# Patient Record
Sex: FEMALE | Race: WHITE | Employment: UNEMPLOYED | URBAN - NONMETROPOLITAN AREA
[De-identification: names, ages, dates, MRNs, and addresses within clinical notes are randomized per-mention and may not be internally consistent; named-entity substitution may affect disease eponyms.]

---

## 2017-07-27 ENCOUNTER — OFFICE VISIT (OUTPATIENT)
Dept: PSYCHIATRY | Age: 42
End: 2017-07-27
Payer: COMMERCIAL

## 2017-07-27 VITALS
WEIGHT: 176 LBS | SYSTOLIC BLOOD PRESSURE: 120 MMHG | HEART RATE: 74 BPM | BODY MASS INDEX: 31.18 KG/M2 | DIASTOLIC BLOOD PRESSURE: 70 MMHG | HEIGHT: 63 IN

## 2017-07-27 DIAGNOSIS — F41.1 GENERALIZED ANXIETY DISORDER: ICD-10-CM

## 2017-07-27 DIAGNOSIS — F33.1 MODERATE EPISODE OF RECURRENT MAJOR DEPRESSIVE DISORDER (HCC): Primary | ICD-10-CM

## 2017-07-27 DIAGNOSIS — G47.00 INSOMNIA, UNSPECIFIED TYPE: ICD-10-CM

## 2017-07-27 PROCEDURE — G0444 DEPRESSION SCREEN ANNUAL: HCPCS | Performed by: NURSE PRACTITIONER

## 2017-07-27 PROCEDURE — 90792 PSYCH DIAG EVAL W/MED SRVCS: CPT | Performed by: NURSE PRACTITIONER

## 2017-07-27 RX ORDER — OMEGA-3 FATTY ACIDS/FISH OIL 300-1000MG
CAPSULE ORAL
COMMUNITY

## 2017-07-27 RX ORDER — CETIRIZINE HYDROCHLORIDE 10 MG/1
10 TABLET ORAL DAILY
COMMUNITY

## 2017-07-27 RX ORDER — IBUPROFEN 200 MG
600 TABLET ORAL EVERY 6 HOURS PRN
COMMUNITY

## 2017-07-27 RX ORDER — MULTIVIT-MIN/FERROUS GLUCONATE 9 MG/15 ML
15 LIQUID (ML) ORAL DAILY
COMMUNITY

## 2017-07-27 RX ORDER — BUPROPION HYDROCHLORIDE 300 MG/1
300 TABLET ORAL EVERY MORNING
COMMUNITY

## 2017-07-27 RX ORDER — HYDROXYCHLOROQUINE SULFATE 200 MG/1
TABLET, FILM COATED ORAL DAILY
COMMUNITY

## 2017-07-27 RX ORDER — SERTRALINE HYDROCHLORIDE 100 MG/1
200 TABLET, FILM COATED ORAL DAILY
Qty: 60 TABLET | Refills: 0 | Status: SHIPPED
Start: 2017-07-27 | End: 2017-10-03 | Stop reason: SDUPTHER

## 2017-07-27 RX ORDER — ARIPIPRAZOLE 5 MG/1
5 TABLET ORAL DAILY
Qty: 30 TABLET | Refills: 0 | Status: SHIPPED | OUTPATIENT
Start: 2017-07-27 | End: 2017-08-10 | Stop reason: SINTOL

## 2017-07-27 RX ORDER — HYDROXYZINE PAMOATE 25 MG/1
25 CAPSULE ORAL 3 TIMES DAILY PRN
COMMUNITY
End: 2017-10-03 | Stop reason: SDUPTHER

## 2017-07-27 RX ORDER — LIDOCAINE 50 MG/G
1 PATCH TOPICAL DAILY PRN
COMMUNITY

## 2017-07-27 RX ORDER — ESZOPICLONE 3 MG/1
3 TABLET, FILM COATED ORAL NIGHTLY
COMMUNITY

## 2017-07-27 RX ORDER — PREGABALIN 150 MG/1
150 CAPSULE ORAL 3 TIMES DAILY
COMMUNITY

## 2017-07-27 RX ORDER — SERTRALINE HYDROCHLORIDE 100 MG/1
250 TABLET, FILM COATED ORAL DAILY
COMMUNITY
End: 2017-07-27 | Stop reason: DRUGHIGH

## 2017-07-27 RX ORDER — CYCLOSPORINE 0.5 MG/ML
1 EMULSION OPHTHALMIC 2 TIMES DAILY
COMMUNITY

## 2017-07-27 ASSESSMENT — ENCOUNTER SYMPTOMS
DOUBLE VISION: 0
ABDOMINAL PAIN: 0
NAUSEA: 0
DIARRHEA: 0
BLURRED VISION: 0
VOMITING: 0
PHOTOPHOBIA: 0

## 2017-07-27 ASSESSMENT — PATIENT HEALTH QUESTIONNAIRE - PHQ9
2. FEELING DOWN, DEPRESSED OR HOPELESS: 3
4. FEELING TIRED OR HAVING LITTLE ENERGY: 2
SUM OF ALL RESPONSES TO PHQ QUESTIONS 1-9: 23
1. LITTLE INTEREST OR PLEASURE IN DOING THINGS: 3
3. TROUBLE FALLING OR STAYING ASLEEP: 2
8. MOVING OR SPEAKING SO SLOWLY THAT OTHER PEOPLE COULD HAVE NOTICED. OR THE OPPOSITE, BEING SO FIGETY OR RESTLESS THAT YOU HAVE BEEN MOVING AROUND A LOT MORE THAN USUAL: 3
7. TROUBLE CONCENTRATING ON THINGS, SUCH AS READING THE NEWSPAPER OR WATCHING TELEVISION: 3
SUM OF ALL RESPONSES TO PHQ9 QUESTIONS 1 & 2: 6
10. IF YOU CHECKED OFF ANY PROBLEMS, HOW DIFFICULT HAVE THESE PROBLEMS MADE IT FOR YOU TO DO YOUR WORK, TAKE CARE OF THINGS AT HOME, OR GET ALONG WITH OTHER PEOPLE: 2
9. THOUGHTS THAT YOU WOULD BE BETTER OFF DEAD, OR OF HURTING YOURSELF: 1
5. POOR APPETITE OR OVEREATING: 3
6. FEELING BAD ABOUT YOURSELF - OR THAT YOU ARE A FAILURE OR HAVE LET YOURSELF OR YOUR FAMILY DOWN: 3

## 2017-07-27 ASSESSMENT — ANXIETY QUESTIONNAIRES
1. FEELING NERVOUS, ANXIOUS, OR ON EDGE: 3-NEARLY EVERY DAY
GAD7 TOTAL SCORE: 17
4. TROUBLE RELAXING: 3-NEARLY EVERY DAY
5. BEING SO RESTLESS THAT IT IS HARD TO SIT STILL: 2-OVER HALF THE DAYS
7. FEELING AFRAID AS IF SOMETHING AWFUL MIGHT HAPPEN: 1-SEVERAL DAYS
3. WORRYING TOO MUCH ABOUT DIFFERENT THINGS: 2-OVER HALF THE DAYS
2. NOT BEING ABLE TO STOP OR CONTROL WORRYING: 3-NEARLY EVERY DAY
6. BECOMING EASILY ANNOYED OR IRRITABLE: 3-NEARLY EVERY DAY

## 2017-08-10 ENCOUNTER — OFFICE VISIT (OUTPATIENT)
Dept: PSYCHIATRY | Age: 42
End: 2017-08-10
Payer: COMMERCIAL

## 2017-08-10 VITALS
DIASTOLIC BLOOD PRESSURE: 70 MMHG | WEIGHT: 176 LBS | HEART RATE: 66 BPM | BODY MASS INDEX: 31.18 KG/M2 | HEIGHT: 63 IN | SYSTOLIC BLOOD PRESSURE: 108 MMHG

## 2017-08-10 DIAGNOSIS — F33.1 MODERATE EPISODE OF RECURRENT MAJOR DEPRESSIVE DISORDER (HCC): Primary | ICD-10-CM

## 2017-08-10 DIAGNOSIS — G47.00 INSOMNIA, UNSPECIFIED TYPE: ICD-10-CM

## 2017-08-10 DIAGNOSIS — F41.1 GENERALIZED ANXIETY DISORDER: ICD-10-CM

## 2017-08-10 PROCEDURE — 99214 OFFICE O/P EST MOD 30 MIN: CPT | Performed by: NURSE PRACTITIONER

## 2017-08-10 ASSESSMENT — ENCOUNTER SYMPTOMS
ABDOMINAL PAIN: 0
SHORTNESS OF BREATH: 0
PHOTOPHOBIA: 0
NAUSEA: 0
BLURRED VISION: 0
DIARRHEA: 0
COUGH: 0
VOMITING: 0
DOUBLE VISION: 0

## 2017-09-05 ENCOUNTER — OFFICE VISIT (OUTPATIENT)
Dept: PSYCHIATRY | Age: 42
End: 2017-09-05
Payer: COMMERCIAL

## 2017-09-05 VITALS — BODY MASS INDEX: 31.01 KG/M2 | WEIGHT: 175 LBS | HEIGHT: 63 IN

## 2017-09-05 DIAGNOSIS — F41.1 GENERALIZED ANXIETY DISORDER: ICD-10-CM

## 2017-09-05 DIAGNOSIS — G47.00 INSOMNIA, UNSPECIFIED TYPE: ICD-10-CM

## 2017-09-05 DIAGNOSIS — F33.1 MODERATE EPISODE OF RECURRENT MAJOR DEPRESSIVE DISORDER (HCC): Primary | ICD-10-CM

## 2017-09-05 PROCEDURE — 99214 OFFICE O/P EST MOD 30 MIN: CPT | Performed by: NURSE PRACTITIONER

## 2017-09-05 ASSESSMENT — ENCOUNTER SYMPTOMS
ABDOMINAL PAIN: 0
NAUSEA: 0
DOUBLE VISION: 0
SHORTNESS OF BREATH: 0
DIARRHEA: 0
BLURRED VISION: 0
COUGH: 0
PHOTOPHOBIA: 0
VOMITING: 0

## 2017-10-03 ENCOUNTER — OFFICE VISIT (OUTPATIENT)
Dept: PSYCHIATRY | Age: 42
End: 2017-10-03
Payer: COMMERCIAL

## 2017-10-03 VITALS
DIASTOLIC BLOOD PRESSURE: 78 MMHG | HEIGHT: 63 IN | SYSTOLIC BLOOD PRESSURE: 108 MMHG | BODY MASS INDEX: 30.83 KG/M2 | WEIGHT: 174 LBS | HEART RATE: 60 BPM

## 2017-10-03 DIAGNOSIS — G47.00 INSOMNIA, UNSPECIFIED TYPE: ICD-10-CM

## 2017-10-03 DIAGNOSIS — F60.9 PERSONALITY DISORDER (HCC): ICD-10-CM

## 2017-10-03 DIAGNOSIS — F33.1 MODERATE EPISODE OF RECURRENT MAJOR DEPRESSIVE DISORDER (HCC): Primary | ICD-10-CM

## 2017-10-03 DIAGNOSIS — F41.1 GENERALIZED ANXIETY DISORDER: ICD-10-CM

## 2017-10-03 PROCEDURE — 99214 OFFICE O/P EST MOD 30 MIN: CPT | Performed by: NURSE PRACTITIONER

## 2017-10-03 RX ORDER — HYDROXYZINE PAMOATE 25 MG/1
25 CAPSULE ORAL 3 TIMES DAILY PRN
Qty: 45 CAPSULE | Refills: 0 | Status: SHIPPED | OUTPATIENT
Start: 2017-10-03

## 2017-10-03 RX ORDER — SERTRALINE HYDROCHLORIDE 100 MG/1
200 TABLET, FILM COATED ORAL DAILY
Qty: 60 TABLET | Refills: 0 | Status: SHIPPED | OUTPATIENT
Start: 2017-10-03

## 2017-10-03 ASSESSMENT — ENCOUNTER SYMPTOMS
DIARRHEA: 0
BLURRED VISION: 0
COUGH: 0
SHORTNESS OF BREATH: 0
PHOTOPHOBIA: 0
ABDOMINAL PAIN: 0
DOUBLE VISION: 0
VOMITING: 0
NAUSEA: 0

## 2017-10-03 NOTE — PROGRESS NOTES
[facility] PROFESSIONAL SERVS  [facility]  [address]  [address]  Dept: [phone]  Dept Fax: [phone]  Loc: [phone]    Visit Date: 10/3/2017    SUBJECTIVE DATA     CHIEF COMPLAINT:    Chief Complaint   Patient presents with    Depression    Anxiety    1 Month Follow-Up       History obtained from: patient    HISTORY OF PRESENT ILLNESS:    Jennifer Adams is a 42 y.o. female who presents to the office for follow up on her depression and anxiety. The patient states she has not noticed much improvement in the overall management of her depression and anxiety despite the switched to [address]. Patient states she has been taking the medication as prescribed but has not noticed any significant improvement in her depression or irritability. She states she continues to have some restlessness as well as some anxiety. Patient states she does have continued feelings of being down and sad. She also feels worthless, hopeless at times. She states she still cries easily and has lost interest in activities. She has not noticed much improvement in her irritability since her last visit. She is tolerating the medication without any significant side effects. She reports she is sleeping okay as long as she takes the [place]. She is scheduled to see her sleep medicine doctor at the end of this month. Patient denies any suicidal ideations, intact, clear. No homicidal ideations, intent, plan. No audio or visual hallucinations. Patient reports she has seen Dr. Matias Greenfield twice. Her most recent visit with Dr. Matias Greenfield was yesterday at which time he reviewed the results of her MMPI testing. Patient states she was told \"I exaggerate my pain and take no responsibility for myself. \"  She also states she was told \"that my tests showed I do have low self-esteem. \"  Patient states she is unsure how this testing will be of benefit.   She states she does have a lot of pain but does not feel that she exaggerates the pain. She states, again today, Elham Cui has benign to should not be together. \"  Patient describes significant communication dysfunction between herself and her . She has another follow-up appointment with Dr. Mitzi Costa in the next week or 2 and is planning to continue with Dr. Mitzi Costa. Mental Health Problem   The primary symptoms include dysphoric mood. The primary symptoms do not include hallucinations. The current episode started more than 1 month ago. This is a recurrent problem. The dysphoric mood began more than 2 weeks ago. The mood has been unchanged since its onset. She characterizes the problem as moderate. The mood includes feelings of sadness, irritability and tearfulness. The degree of incapacity that she is experiencing as a consequence of her illness is moderate. Sequelae of the illness include an inability to work. Additional symptoms of the illness include anhedonia, insomnia, agitation and feelings of worthlessness. Additional symptoms of the illness do not include no hypersomnia, no unexpected weight change, no fatigue, no euphoric mood, no decreased need for sleep, no poor judgment, no headaches, no abdominal pain or no seizures. She does not admit to suicidal ideas. She does not have a plan to commit suicide. She does not contemplate harming herself. She does not contemplate injuring another person. She has not already  injured another person.      Adverse reactions from psychotropic medications:  Sexual side effects that are intolerable along with restlessness with Abilify      Current Psychiatric Review of Systems         [place] or Hypomania:  None known     Panic Attacks:  no     Phobias:  no     Obsessions and Compulsions:  no     Body or Vocal Tics:  no     Hallucinations:  no     Delusions:  no    SOCIAL HISTORY:      Social History     Social History    Marital status:      Spouse name: One [place] [employer] Number of children: 3    Years of education: N/A Occupational History          on SSDI     Social History Main Topics    Smoking status: Never Smoker    Smokeless tobacco: Never Used    Alcohol use Yes      Comment: Socially    Drug use: No    Sexual activity: Yes     Partners: Male     Other Topics Concern    Not on file     Social History Narrative    7/27/2017    LEVEL OF EDUCATION: graduated high school and then did 2 years of college    SPECIAL EDUCATION NEEDS: none    RESIDENCE: Currently lives with her  and 3 children    LEGAL HISTORY: None    Advent: None    TRAUMA: None    : None    HOBBIES: gardening    EMPLOYMENT: was approved for SSDI in February 2017. Prior to that time she was was long term disability from May 2014 until the SSDI was approved. Patient states she lost her job in Aug. 2014 after she had been off for 6 months time due to surgeries. Patient states she is on SSDI for the post laminectomy cervical syndrome, complex regional pain syndrome of the right foot and fibromyalgia    SUBSTANCE USE: None    CHILDREN: Gaurav Shah age 13, Saad Webber age 15 and Lesly Guerrero age 10    MARRIAGE: Patient was  to her first  from [id number]. The pair  in 2005 and the divorce was finalized in 2007. Patient then  her current  in Aug. 2008.              FAMILY HISTORY:   Family History   Problem Relation Age of Onset    Diabetes Father     High Blood Pressure Father     Heart Disease Father     Kidney Disease Father     Arthritis Father     Cancer Father      skin    Other Mother      IBS, fibromyalgia    High Blood Pressure Mother     High Cholesterol Mother     Arthritis Mother     High Blood Pressure Sister     High Cholesterol Sister     High Blood Pressure Brother     Mental Illness Brother      anxiety       Psychiatric Family History  Sister is alcoholic; Brother had anxiety    PAST MEDICAL HISTORY:    Past Medical History:   Diagnosis Date    Cervical post-laminectomy syndrome     Cervical DIAGNOSIS AND ASSESSMENT DATA     DIAGNOSIS:   1. Moderate episode of recurrent major depressive disorder ([facility] [address]    2. Generalized anxiety disorder    3. Insomnia, unspecified type    4. Personality disorder        PLAN   Follow-up:  Return in about 4 weeks (around 10/31/2017), or if symptoms worsen or fail to improve, for follow-up and medication management. Prescriptions for this encounter:  New Prescriptions    No medications on file       Orders Placed This Encounter   Medications    brexpiprazole (REXULTI) 1 MG TABS tablet     Sig: Take 1 tablet by mouth daily     Dispense:  30 tablet     Refill:  0    sertraline (ZOLOFT) 100 MG tablet     Sig: Take 2 tablets by mouth daily     Dispense:  60 tablet     Refill:  0    hydrOXYzine (VISTARIL) 25 MG capsule     Sig: Take 1 capsule by mouth 3 times daily as needed for Anxiety     Dispense:  45 capsule     Refill:  0       Medications Discontinued During This Encounter   Medication Reason    brexpiprazole (REXULTI) 0.5 MG TABS tablet Dose adjustment    sertraline (ZOLOFT) 100 MG tablet Reorder    hydrOXYzine (VISTARIL) 25 MG capsule Reorder       Additional orders:  Orders Placed This Encounter   Procedures    CBC Auto Differential    Comprehensive Metabolic Panel    TSH without Reflex    T4, Free     Discussed patient M MMPI results with patient. Discussed at length with patient how depression, pain and pain tolerance/management are connected. Also discussed with patient the importance of taking responsibility for her actions within her relationship as well as her responses and interactions with her . Discussed the importance of effective communication and use of \"I statements. \"  Patient is encouraged to utilize the results of the MMPI testing as a way to reflect upon her current symptoms and how she interacts with others.     Patient is tolerating her current medications well, But has not noticed any significant change in the overall management of her symptoms. She will continue with current doses of Wellbutrin, hydroxyzine, and Zoloft. The Rexulti will be increased at this time. Patient will start Rexulti 1 milligram by mouth once daily. She is to keep her upcoming appointment with the sleep medicine specialist.  She is to keep all upcoming appointments with Dr. Adry Dubois for individual psychotherapy. Discussed the benefits of not pharmacologic management of depression and anxiety including relaxation, meditation, journaling, and calming music. Patient has been encouraged to obtain employment that is conducive with both management of her chronic pain symptoms and the requirements of her SSDI. Discussed the benefits of employment in detail with the patient including structure, routine, financial independence, and increased socialization. Supportive therapy provided. Also discussed the option of [employer] screening. Risks, potential side effects, possible drug-drug interactions, benefits and alternate treatments discussed in detail. All questions answered. Patient stated understanding and is agreeable to treatment plan. Patient has been instructed to seek emergency help via the emergency and/or calling 911 should symptoms become severe, worsen, or with other concerning symptoms. Patient instructed to go immediately to the emergency room and/or call 911 with any suicidal or homicidal ideations or if audio/visual hallucinations develop  Patient stated understanding and agrees. Patient given crisis center information. I spent a total of 30 minutes with the patient and over half of that time was spent on counseling and coordination of care regarding topics discussed above.     Provider Signature:  Electronically signed by Manuel Matias CNP on 9/5/2017 at 5:00 PM

## 2017-10-03 NOTE — MR AVS SNAPSHOT
After Visit Summary             Claudio Delgado   10/3/2017 9:00 AM   Office Visit    Description:  Female : 1975   Provider:  Ant Bautista CNP   Department:  [facility] and Future Appointments         Below is a list of your follow-up and future appointments. This may not be a complete list as you may have made appointments directly with providers that we are not aware of or your providers may have made some for you. Please call your providers to confirm appointments. It is important to keep your appointments. Please bring your current insurance card, photo ID, co-pay, and all medication bottles to your appointment. If self-pay, payment is expected at the time of service. Your To-Do List     Future Appointments Provider Department Dept Phone    10/31/2017 9:00 AM Ant Bautista CNP [facility] [phone]    Please arrive 15 minutes prior to appointment time, bring insurance card and photo ID. Please arrive 15 minutes prior to appointment, bring insurance card and photo ID.    2017 5:00 PM Hellen Quezada, PhD N[facility] Rivera [phone]    Please arrive 15 minutes prior to appointment time, bring insurance card and photo ID. Please arrive 15 minutes prior to appointment, bring insurance card and photo ID.    2017 10:15 AM Rayna Soriano. [facility] [address] [phone]    Future Orders Complete By Expires    CBC Auto Differential [[id number] Custom]  2017 10/3/2018    Comprehensive Metabolic Panel [DJV82 Custom]  2017 10/3/2018    T4, Free [[id number] Custom]  2017 10/3/2018    TSH without Reflex [[id number] Custom]  2017 10/3/2018    Follow-Up    Return in about 4 weeks (around 10/31/2017), or if symptoms worsen or fail to improve, for follow-up and medication management.          Information from Your Visit        Department     Name Address Phone Fax Hours:  24-hours Phone:  [phone]     hydrOXYzine 25 MG capsule    sertraline 100 MG tablet         Information about where to get these medications is not yet available     ! Ask your nurse or doctor about these medications     brexpiprazole 1 MG Tabs tablet               Your Current Medications Are              brexpiprazole (REXULTI) 1 MG TABS tablet Take 1 tablet by mouth daily    sertraline (ZOLOFT) 100 MG tablet Take 2 tablets by mouth daily    hydrOXYzine (VISTARIL) 25 MG capsule Take 1 capsule by mouth 3 times daily as needed for Anxiety    Probiotic Product (PROBIOTIC & ACIDOPHILUS EX ST PO) Take by mouth    buPROPion (WELLBUTRIN XL) 300 MG extended release tablet Take 300 mg by mouth every morning    cetirizine (ZYRTEC) 10 MG tablet Take 10 mg by mouth daily    eszopiclone (ESZOPICLONE) 3 MG TABS Take 3 mg by mouth nightly    hydroxychloroquine (PLAQUENIL) 200 MG tablet Take by mouth daily    ibuprofen (ADVIL;MOTRIN) 200 MG tablet Take 600 mg by mouth every 6 hours as needed for Pain    lidocaine (LIDODERM) 5 % Place 1 patch onto the skin daily as needed for Pain 12 hours on, 12 hours off. Multiple Vitamins-Minerals (CENTRUM/CERTA-JEREMY WITH MINERALS ORAL) solution Take 15 mLs by mouth daily    oxaprozin (DAYPRO) 600 MG tablet Take 600 mg by mouth 2 times daily    pregabalin (LYRICA) 150 MG capsule Take 150 mg by mouth three times daily    cycloSPORINE (RESTASIS) 0.05 % ophthalmic emulsion 1 drop 2 times daily    Omega 3 1000 MG CAPS Take by mouth    montelukast (SINGULAIR) 10 MG tablet Take 10 mg by mouth nightly. pantoprazole (PROTONIX) 40 MG tablet Take 40 mg by mouth daily. cyclobenzaprine (FLEXERIL) 10 MG tablet Take 10 mg by mouth 3 times daily as needed.       Allergies           No Known Allergies         Additional Information        Basic Information     Date Of Birth Sex Race Ethnicity Preferred Language    1975 Female White Non-/Non  [place] If you have questions, please contact the physician practice where you receive care. Remember, MyChart is NOT to be used for urgent needs. For medical emergencies, dial 911. For questions regarding your MyChart account call [phone]. If you have a clinical question, please call your doctor's office.

## 2017-11-06 ENCOUNTER — INITIAL CONSULT (OUTPATIENT)
Dept: PULMONOLOGY | Age: 42
End: 2017-11-06
Payer: COMMERCIAL

## 2017-11-06 VITALS
OXYGEN SATURATION: 96 % | HEIGHT: 63 IN | SYSTOLIC BLOOD PRESSURE: 100 MMHG | HEART RATE: 73 BPM | TEMPERATURE: 96.7 F | BODY MASS INDEX: 31.75 KG/M2 | DIASTOLIC BLOOD PRESSURE: 64 MMHG | WEIGHT: 179.2 LBS

## 2017-11-06 DIAGNOSIS — G89.4 CHRONIC PAIN SYNDROME: Primary | ICD-10-CM

## 2017-11-06 DIAGNOSIS — G47.9 DISTURBANCE OF SLEEP PATTERN ASSOCIATED WITH PAIN: ICD-10-CM

## 2017-11-06 DIAGNOSIS — E66.9 OBESITY (BMI 30-39.9): ICD-10-CM

## 2017-11-06 DIAGNOSIS — R52 DISTURBANCE OF SLEEP PATTERN ASSOCIATED WITH PAIN: ICD-10-CM

## 2017-11-06 DIAGNOSIS — M06.9 RHEUMATOID ARTHRITIS INVOLVING MULTIPLE SITES, UNSPECIFIED RHEUMATOID FACTOR PRESENCE: ICD-10-CM

## 2017-11-06 PROCEDURE — 99203 OFFICE O/P NEW LOW 30 MIN: CPT | Performed by: INTERNAL MEDICINE

## 2017-11-06 RX ORDER — TRAZODONE HYDROCHLORIDE 50 MG/1
50 TABLET ORAL NIGHTLY
Qty: 30 TABLET | Refills: 5 | Status: SHIPPED | OUTPATIENT
Start: 2017-11-06

## 2017-11-06 NOTE — PATIENT INSTRUCTIONS
your doctor about stop-smoking programs and medicines. These can increase your chances of quitting for good. · Prop up the head of your bed 4 to 6 inches by putting bricks under the legs of the bed. · Treat breathing problems, such as a stuffy nose, caused by a cold or allergies. · Try a continuous positive airway pressure (CPAP) breathing machine if your doctor recommends it. The machine keeps your airway open when you sleep. · If CPAP does not work for you, ask your doctor if you can try other breathing machines. A bilevel positive airway pressure machine uses one type of air pressure for breathing in and another type for breathing out. Another device raises or lowers air pressure as needed while you breathe. · Talk to your doctor if:  ¨ Your nose feels dry or bleeds when you use one of these machines. You may need to increase moisture in the air. A humidifier may help. ¨ Your nose is runny or stuffy from using a breathing machine. Decongestants or a corticosteroid nasal spray may help. ¨ You are sleepy during the day and it gets in the way of the normal things you do. Do not drive when you are drowsy. When should you call for help? Watch closely for changes in your health, and be sure to contact your doctor if:  · You still have sleep apnea even though you have made lifestyle changes. · You are thinking of trying a device such as CPAP. · You are having problems using a CPAP or similar machine. Where can you learn more? Go to https://Firm58.[employer]. org and sign in to your CDNetworks account. Enter W096 in the [employer] box to learn more about \"Sleep Apnea: Care Instructions. \"     If you do not have an account, please click on the \"Sign Up Now\" link. Current as of: March 25, 2017  Content Version: 11.3  © 8063-5086 [employer] Care instructions adapted under license by [facility] ([facility]).  If you have questions about a medical condition or this instruction, always ask and mattress. · If watching the clock makes you anxious, turn it facing away from you so you cannot see the time. · If you worry when you lie down, start a worry book. Well before bedtime, write down your worries, and then set the book and your concerns aside. · Try meditation or other relaxation techniques before you go to bed. · If you cannot fall asleep, get up and go to another room until you feel sleepy. Do something relaxing. Repeat your bedtime routine before you go to bed again. · Make your house quiet and calm about an hour before bedtime. Turn down the lights, turn off the TV, log off the computer, and turn down the volume on music. This can help you relax after a busy day. When should you call for help? Watch closely for changes in your health, and be sure to contact your doctor if:  · Your efforts to improve your sleep do not work. · Your insomnia gets worse. · You have been feeling down, depressed, or hopeless or have lost interest in things that you usually enjoy. Where can you learn more? Go to https://be2.[employer]. org and sign in to your IvyDate account. Enter P513 in the BrightTALK box to learn more about \"Insomnia: Care Instructions. \"     If you do not have an account, please click on the \"Sign Up Now\" link. Current as of: July 26, 2016  Content Version: 11.3  © 9291-4425 [employer]. Care instructions adapted under license by [facility] ([facility]). If you have questions about a medical condition or this instruction, always ask your healthcare professional. [address] any warranty or liability for your use of this information.

## 2017-11-06 NOTE — COMMUNICATION BODY
involving multiple sites, unspecified rheumatoid factor presence (HCC)     4. Obesity (BMI 30-39. 9)     Sleep disruption seems to be related to musculoskeletal pain rather than NERY, no strong indication for polysomnogram.  Trazodone may be of benefit for sleep maintenance in this setting and certainly if it is not will stop    Plan    Will defer PSG for now  Trazodone 50 mh HS  Sleep hygiene instructions discussed and provided  Continue antidepressants/pain control interventions

## 2017-11-06 NOTE — LETTER
hours as needed for Pain      lidocaine (LIDODERM) 5 % Place 1 patch onto the skin daily as needed for Pain 12 hours on, 12 hours off.  Multiple Vitamins-Minerals (CENTRUM/CERTA-JEREMY WITH MINERALS ORAL) solution Take 15 mLs by mouth daily      oxaprozin (DAYPRO) 600 MG tablet Take 600 mg by mouth 2 times daily      pregabalin (LYRICA) 150 MG capsule Take 150 mg by mouth three times daily      cycloSPORINE (RESTASIS) 0.05 % ophthalmic emulsion 1 drop 2 times daily      Omega 3 1000 MG CAPS Take by mouth      montelukast (SINGULAIR) 10 MG tablet Take 10 mg by mouth nightly.  pantoprazole (PROTONIX) 40 MG tablet Take 40 mg by mouth daily.  cyclobenzaprine (FLEXERIL) 10 MG tablet Take 10 mg by mouth 3 times daily as needed. No current facility-administered medications for this visit. Family History   Problem Relation Age of Onset    Diabetes Father     High Blood Pressure Father     Heart Disease Father     Kidney Disease Father     Arthritis Father     Cancer Father      skin    Other Mother      IBS, fibromyalgia    High Blood Pressure Mother     High Cholesterol Mother     Arthritis Mother     High Blood Pressure Sister     High Cholesterol Sister     High Blood Pressure Brother     Mental Illness Brother      anxiety        Any family history of any sleep problems or any one in your family on CPAP? Yes her twin brother    Caffeine Intake: How much soda (pop), coffee, tea, power drinks do you ingest per day? 1 per day. Employment History:  Where do you work? Disabled     Physical Exam:    HEIGHTHeight: 5' 3\" (160 cm) WEIGHTWeight: 179 lb 3.2 oz (81.3 kg)    BMI:  Body mass index is 31.74 kg/m². Neck Size: 15.25in.   Oxygen Sat: 96%    ESS: 8  Vitals: /64 (Site: Left Arm, Position: Sitting, Cuff Size: Large Adult)   Pulse 73   Temp 96.7 °F (35.9 °C) (Tympanic)   Ht 5' 3\" (1.6 m)   Wt 179 lb 3.2 oz (81.3 kg)   SpO2 96% Comment: RA at rest  BMI

## 2017-11-06 NOTE — PROGRESS NOTES
New Sleep Patient H/P  CC: Insomnia     Darylene Righter is referred by Flori Lima to see if stating Trazodone is a good idea  Follows with Dr Caitlyn Oneal rheumatologist for RA/fibromyalgia, Dr Miles Fisher pain management , has a lot of somatic pain and anxiety , neck surgery,  foot surgeries, a lot of hip pain, pain prompts Nai to toss and turn during the night to readjust and find a comfortable position, also has Depression/anxiety on multiple medicines  Needs surgery for CTS on the right and given her a lot of problems   Has taken Lunesta as a hypnotic for a long time and is not working as well as before , referred to be started on Trazodone  Takes naps during the day  Does not snore, denies sleep related choking sensation, no witnessed apneas by family or friends  Follows with Dr Caitlyn Oneal at [facility]      Symptoms began:  several years ago. Symptoms include: disrupted sleep due to pain, sleep with air purifier due to allergies, has asthma    Previous evaluation and treatment? No          Time in Bed:   Bedtime: 9 p.m. Awakens  6 a.m. Different on weekends? Yes, sleeps in some     Nai falls asleep in 40  minutes. Any awakenings? Yes 5 or 6 times due to hip pain or her hand is hurting  Difficulty Falling back to sleep? Yes    Naps:  Any naps? Yes and are they helpful Yes    Snoring and Apneas:  Do you snore or been told you a snore? No  Any witnessed apneas? No  Any awakenings with choking or gasping? No    Dreams:  Any recurring dreams? No  Hallucinations? No  Sleep Paralysis? No  Symptoms of Cataplexy? No    Driving History:  Do you have a CDL or drive long distances for work? No  Any driving accidents in the past year? No      Weight:  Any change in weight over the past year? Yes   How about past 5 years? No  How much? 15    Other Compliants :Nai complains of decreased concentration as well.     Past Medical History:   Diagnosis Date    Cervical post-laminectomy syndrome     Cervical spondylosis     Chronic low by mouth nightly.  pantoprazole (PROTONIX) 40 MG tablet Take 40 mg by mouth daily.  cyclobenzaprine (FLEXERIL) 10 MG tablet Take 10 mg by mouth 3 times daily as needed. No current facility-administered medications for this visit. Family History   Problem Relation Age of Onset    Diabetes Father     High Blood Pressure Father     Heart Disease Father     Kidney Disease Father     Arthritis Father     Cancer Father      skin    Other Mother      IBS, fibromyalgia    High Blood Pressure Mother     High Cholesterol Mother     Arthritis Mother     High Blood Pressure Sister     High Cholesterol Sister     High Blood Pressure Brother     Mental Illness Brother      anxiety        Any family history of any sleep problems or any one in your family on CPAP? Yes her twin brother    Caffeine Intake: How much soda (pop), coffee, tea, power drinks do you ingest per day? 1 per day. Employment History:  Where do you work? Disabled     Physical Exam:    HEIGHTHeight: 5' 3\" (160 cm) WEIGHTWeight: 179 lb 3.2 oz (81.3 kg)    BMI:  Body mass index is 31.74 kg/m². Neck Size: 15.25in. Oxygen Sat: 96%    ESS: 8  Vitals: /64 (Site: Left Arm, Position: Sitting, Cuff Size: Large Adult)   Pulse 73   Temp 96.7 °F (35.9 °C) (Tympanic)   Ht 5' 3\" (1.6 m)   Wt 179 lb 3.2 oz (81.3 kg)   SpO2 96% Comment: RA at rest  BMI 31.74 kg/m²       Mallampati Score: 4    General appearance: Obesity BMI 31  Nose: Nares normal. Septum midline. Mucosa normal. No drainage or sinus tenderness. Oropharynx:  Mallampati class 2  Lungs: clear to auscultation bilaterally  Heart: regular rate and rhythm, S1, S2 normal, no murmur, click, rub or gallop  Extremities: extremities normal, atraumatic, no cyanosis or edema  Neurologic: Mental status: Alert, oriented, thought content appropriate      Assessment   1. Chronic pain syndrome     2. Disturbance of sleep pattern associated with pain     3.  Rheumatoid arthritis involving multiple sites, unspecified rheumatoid factor presence (HCC)     4. Obesity (BMI 30-39. 9)     Sleep disruption seems to be related to musculoskeletal pain rather than NERY, no strong indication for polysomnogram.  Trazodone may be of benefit for sleep maintenance in this setting and certainly if it is not will stop    Plan    Will defer PSG for now  Trazodone 50 mh HS  Sleep hygiene instructions discussed and provided  Continue antidepressants/pain control interventions

## 2018-05-10 ENCOUNTER — HOSPITAL ENCOUNTER (OUTPATIENT)
Dept: CT IMAGING | Age: 43
Discharge: HOME | End: 2018-05-10
Payer: COMMERCIAL

## 2018-05-10 DIAGNOSIS — R10.30 LOWER ABDOMINAL PAIN: ICD-10-CM

## 2018-05-10 PROCEDURE — 74177 CT ABD & PELVIS W/CONTRAST: CPT

## 2018-05-10 PROCEDURE — 6360000004 HC RX CONTRAST MEDICATION: Performed by: NURSE PRACTITIONER

## 2018-05-10 RX ADMIN — IOHEXOL 50 ML: 240 INJECTION, SOLUTION INTRATHECAL; INTRAVASCULAR; INTRAVENOUS; ORAL at 12:38

## 2018-05-10 RX ADMIN — IOPAMIDOL 85 ML: 755 INJECTION, SOLUTION INTRAVENOUS at 12:38

## 2025-01-18 ENCOUNTER — APPOINTMENT (OUTPATIENT)
Dept: GENERAL RADIOLOGY | Age: 50
End: 2025-01-18
Payer: COMMERCIAL

## 2025-01-18 ENCOUNTER — HOSPITAL ENCOUNTER (EMERGENCY)
Age: 50
Discharge: HOME | End: 2025-01-18
Payer: COMMERCIAL

## 2025-01-18 VITALS
OXYGEN SATURATION: 98 % | HEART RATE: 81 BPM | RESPIRATION RATE: 16 BRPM | TEMPERATURE: 98.1 F | DIASTOLIC BLOOD PRESSURE: 82 MMHG | SYSTOLIC BLOOD PRESSURE: 137 MMHG | BODY MASS INDEX: 33.13 KG/M2 | WEIGHT: 187 LBS

## 2025-01-18 DIAGNOSIS — J18.9 PNEUMONIA OF LEFT LUNG DUE TO INFECTIOUS ORGANISM, UNSPECIFIED PART OF LUNG: Primary | ICD-10-CM

## 2025-01-18 PROCEDURE — 71046 X-RAY EXAM CHEST 2 VIEWS: CPT

## 2025-01-18 PROCEDURE — 99203 OFFICE O/P NEW LOW 30 MIN: CPT | Performed by: NURSE PRACTITIONER

## 2025-01-18 PROCEDURE — 99213 OFFICE O/P EST LOW 20 MIN: CPT

## 2025-01-18 RX ORDER — TIZANIDINE 2 MG/1
2 TABLET ORAL EVERY 6 HOURS PRN
COMMUNITY

## 2025-01-18 RX ORDER — PREDNISONE 20 MG/1
40 TABLET ORAL DAILY
Qty: 20 TABLET | Refills: 0 | Status: SHIPPED | OUTPATIENT
Start: 2025-01-18 | End: 2025-01-28

## 2025-01-18 RX ORDER — METHYLPREDNISOLONE 4 MG/1
TABLET ORAL
COMMUNITY
Start: 2025-01-15

## 2025-01-18 RX ORDER — VONOPRAZAN FUMARATE 26.72 MG/1
TABLET ORAL
COMMUNITY
Start: 2024-12-11

## 2025-01-18 RX ORDER — BENZONATATE 200 MG/1
200 CAPSULE ORAL 3 TIMES DAILY PRN
Qty: 30 CAPSULE | Refills: 0 | Status: SHIPPED | OUTPATIENT
Start: 2025-01-18 | End: 2025-01-25

## 2025-01-18 RX ORDER — AZITHROMYCIN 250 MG/1
TABLET, FILM COATED ORAL
Qty: 1 PACKET | Refills: 0 | Status: SHIPPED | OUTPATIENT
Start: 2025-01-18 | End: 2025-01-22

## 2025-01-18 ASSESSMENT — ENCOUNTER SYMPTOMS
CHEST TIGHTNESS: 1
SORE THROAT: 0
DIARRHEA: 0
RHINORRHEA: 0
COUGH: 1
NAUSEA: 0
VOMITING: 0
SHORTNESS OF BREATH: 0

## 2025-01-18 ASSESSMENT — PAIN DESCRIPTION - LOCATION: LOCATION: CHEST

## 2025-01-18 ASSESSMENT — PAIN DESCRIPTION - FREQUENCY: FREQUENCY: CONTINUOUS

## 2025-01-18 ASSESSMENT — PAIN - FUNCTIONAL ASSESSMENT
PAIN_FUNCTIONAL_ASSESSMENT: ACTIVITIES ARE NOT PREVENTED
PAIN_FUNCTIONAL_ASSESSMENT: 0-10

## 2025-01-18 ASSESSMENT — PAIN SCALES - GENERAL: PAINLEVEL_OUTOF10: 6

## 2025-01-18 NOTE — ED TRIAGE NOTES
Nai arrives to room with complaint of  chest burning, fever 101.9, body aches, chills, cough, hard to take a deep breath  started Monday.  Tested for flu/covid 1/15/25 which was negative.     PCP prescribed, medrol dose pack on 1/15/25. Pt still taking cough meds, and medrol dose pack

## 2025-01-18 NOTE — ED PROVIDER NOTES
Details   methylPREDNISolone (MEDROL DOSEPACK) 4 MG tablet Historical Med      VOQUEZNA 20 MG TABS DAWHistorical Med      tiZANidine (ZANAFLEX) 2 MG tablet Take 1 tablet by mouth every 6 hours as neededHistorical Med      hydrOXYzine (VISTARIL) 25 MG capsule Take 1 capsule by mouth 3 times daily as needed for Anxiety, Disp-45 capsule, R-0Normal      buPROPion (WELLBUTRIN XL) 300 MG extended release tablet Take 300 mg by mouth every morningHistorical Med      cetirizine (ZYRTEC) 10 MG tablet Take 10 mg by mouth dailyHistorical Med      eszopiclone (ESZOPICLONE) 3 MG TABS Take 3 mg by mouth nightlyHistorical Med      hydroxychloroquine (PLAQUENIL) 200 MG tablet Take by mouth dailyHistorical Med      ibuprofen (ADVIL;MOTRIN) 200 MG tablet Take 600 mg by mouth every 6 hours as needed for PainHistorical Med      Multiple Vitamins-Minerals (CENTRUM/CERTA-JEREMY WITH MINERALS ORAL) solution Take 15 mLs by mouth dailyHistorical Med      pregabalin (LYRICA) 150 MG capsule Take 150 mg by mouth three times dailyHistorical Med      cycloSPORINE (RESTASIS) 0.05 % ophthalmic emulsion 1 drop 2 times dailyHistorical Med      Omega 3 1000 MG CAPS Take by mouthHistorical Med      montelukast (SINGULAIR) 10 MG tablet Take 10 mg by mouth nightly.             ALLERGIES     Patient is has No Known Allergies.    Patients   Immunization History   Administered Date(s) Administered    COVID-19, J&J, (age 18y+), IM, 0.5 mL 04/08/2021       FAMILY HISTORY     Patient's family history includes Arthritis in her father and mother; Cancer in her father; Diabetes in her father; Heart Disease in her father; High Blood Pressure in her brother, father, mother, and sister; High Cholesterol in her mother and sister; Kidney Disease in her father; Mental Illness in her brother; Other in her mother.    SOCIAL HISTORY     Patient  reports that she has never smoked. She has never used smokeless tobacco. She reports current alcohol use. She reports that she